# Patient Record
(demographics unavailable — no encounter records)

---

## 2024-10-25 NOTE — PHYSICAL EXAM
[2+] : 2+ [Normal Gait and Station] : normal gait and station [Normal muscle strength, symmetry and tone of facial, head and neck musculature] : normal muscle strength, symmetry and tone of facial, head and neck musculature [Normal] : no cervical lymphadenopathy [Exposed Vessel] : left anterior vessel not exposed [Increased Work of Breathing] : no increased work of breathing with use of accessory muscles and retractions [FreeTextEntry7] : soft ballotable mass in post auricular crease

## 2024-10-25 NOTE — CONSULT LETTER
[Dear  ___] : Dear  [unfilled], [FreeTextEntry2] : Dr. Brenton Alvarez [FreeTextEntry3] : Swapnil Workman MD  Pediatric Otolaryngology/ Head & Neck Surgery [facility] [address] [place] [address] Tel [phone] Fax [phone]

## 2024-10-25 NOTE — HISTORY OF PRESENT ILLNESS
[de-identified] : 10/25/24 Recently sick with URI, currently has nasal congestion and rhinorrhea Was using flonase PRN, stopped when he got sick 2 weeks ago Snoring at night, NO choking gasping or apneas PSG scheduled for 12/17/24 Eating and drinking well, no difficulty swallowing No difficulty breathing  04-22-24 21 month old male presents for follow up. Mother reports runny nose has improved. No recent ear infections. Continues to snore but no pauses in breathing, apneic episodes.  Used flonase with mild improvement  01-19-24 Darin is a 17 month old M with chronic nasal congestion A few weeks ago started with purulent drainage, improved with abx given by PMD Seen by another ENT who said give it time  +Nasal congestion No prior nasal steroid use +Snoring, daytime tiredness No prior PSG  No recent ear infections No otorrhea Bump behind L ear that concerns parents Passed [facility] Patient babbles and says 4 words, concern noted by parents No recent throat infections No bleeding or anesthesia issues

## 2024-10-25 NOTE — BIRTH HISTORY
[At ___ Weeks Gestation] : at [unfilled] weeks gestation [Normal Vaginal Route] : by normal vaginal route [None] : No maternal complications [Passed] : passed [de-identified] : ingested muconium, nicu x 1 day

## 2024-10-25 NOTE — REASON FOR VISIT
[Subsequent Evaluation] : a subsequent evaluation for [Nasal Obstruction] : nasal obstruction [Nasal Discharge] : nasal discharge [Sleep Apnea/ Snoring] : sleep apnea/ snoring [Parents] : parents

## 2024-10-25 NOTE — ASSESSMENT
[FreeTextEntry1] : 2 year male with Nasal congestion and severe adenoid hypertrophy. Discussed medical vs surgical therapy  Can trial nasal steroid when symptomatic.  Use at least for 3 weeks before stopping.   Await PSG and plan adenoids, possible T&A  soft ballotable mass in post auricular crease.  Most likely benign.  Can consider excision if goes to OR for other reasons.  ordered US.    F/u after PSG.

## 2025-01-27 NOTE — PHYSICAL EXAM
[General Appearance - Alert] : alert [General Appearance - Well-Appearing] : well appearing [General Appearance - In No Acute Distress] : in no acute distress [Appearance Of Head] : the head was normocephalic [Evidence Of Head Injury] : threre was no evidence of injury [Facies] : no facial abnormalities were observed [Sclera] : the conjunctiva were normal [Outer Ear] : the ears and nose were normal in appearance [Examination Of The Oral Cavity] : mucous membranes were moist and pink [Normal Appearance] : was normal in appearance [Neck Supple] : was supple [Respiration, Rhythm And Depth] : normal respiratory rhythm and effort [Auscultation Breath Sounds / Voice Sounds] : clear bilateral breath sounds [Heart Rate And Rhythm] : heart rate and rhythm were normal [Heart Sounds] : normal S1 and S2 [Murmurs] : no murmurs [Bowel Sounds] : normal bowel sounds [Abdomen Soft] : soft [Abdomen Tenderness] : non-tender [Abdominal Distention] : nondistended [] : no hepato-splenomegaly [FROM Extremities] : there was normal movement of all extremities [Normal Motor Tone] : the muscle tone was normal [Motor Tone] : muscle strength and tone were normal [Generalized Lymph Node Enlargement] : no lymphadenopathy [Abnormal Color] : normal color and pigmentation [Skin Lesions 1] : no skin lesions were observed [Skin Turgor Decreased] : normal skin turgor [Normal] : normal texture and mobility [Breast Appearance] : normal in appearance [Penis Abnormality] : the penis was normal [Testes Cryptorchism] : both testicles were descended

## 2025-01-27 NOTE — HISTORY OF PRESENT ILLNESS
[Preoperative Visit] : for a medical evaluation prior to surgery [Good] : Good [Fever] : no fever [Chills] : no chills [Earache] : no earache [Headache] : no headache [Sore Throat] : no sore throat [Cough] : no cough [Appetite] : no decrease in appetite [Nausea] : no nausea [Vomiting] : no vomiting [Abdominal Pain] : no abdominal pain [Diarrhea] : no diarrhea [Easy Bruising] : no easy bruising [Rash] : no rash [Dysuria] : no dysuria [Urinary Frequency] : no urinary frequency [Prior Anesthesia] : No prior anesthesia [Prev Anesthesia Reaction] : no previous anesthesia reaction [Pulmonary Disease] : no pulmonary disease [Renal Disease] : no renal disease [GI Disease] : no gastrointestinal disease [Sleep Apnea] : sleep apnea [Transfusion Reaction] : no transfusion reaction [Impaired Immunity] : no impaired immunity [Frequent use of NSAIDs] : no use of NSAIDs [Anesthesia Reaction] : no anesthesia reaction [Clotting Disorder] : no clotting disorder [Bleeding Disorder] : no bleeding disorder [Sudden Death] : no sudden death [FreeTextEntry1] : T&A [FreeTextEntry2] : 02/19/2025 [de-identified] : Dr. Workman

## 2025-04-18 NOTE — CONSULT LETTER
[Dear  ___] : Dear  [unfilled], [FreeTextEntry2] : Dr. Brenton Alvarez [FreeTextEntry3] : Swapnil Workman MD  Pediatric Otolaryngology/ Head & Neck Surgery [facility] [address] [place] [address] Tel [phone] Fax [phone]

## 2025-04-18 NOTE — HISTORY OF PRESENT ILLNESS
[de-identified] : 4/18/25 S/P adenoids and left postauricular mass excision 2/19/25 no more snoring or nasal congestion still having runny nose  not using Flonase no recent ear, throat, or lung infections  no hearing or speech concerns  10/25/24 Recently sick with URI, currently has nasal congestion and rhinorrhea Was using flonase PRN, stopped when he got sick 2 weeks ago Snoring at night, NO choking gasping or apneas PSG scheduled for 12/17/24 Eating and drinking well, no difficulty swallowing No difficulty breathing  04-22-24 21 month old male presents for follow up. Mother reports runny nose has improved. No recent ear infections. Continues to snore but no pauses in breathing, apneic episodes.  Used flonase with mild improvement  01-19-24 Darin is a 17 month old M with chronic nasal congestion A few weeks ago started with purulent drainage, improved with abx given by PMD Seen by another ENT who said give it time  +Nasal congestion No prior nasal steroid use +Snoring, daytime tiredness No prior PSG  No recent ear infections No otorrhea Bump behind L ear that concerns parents Passed [facility] Patient babbles and says 4 words, concern noted by parents No recent throat infections No bleeding or anesthesia issues

## 2025-04-18 NOTE — PHYSICAL EXAM
[2+] : 2+ [Normal Gait and Station] : normal gait and station [Normal muscle strength, symmetry and tone of facial, head and neck musculature] : normal muscle strength, symmetry and tone of facial, head and neck musculature [Normal] : no cervical lymphadenopathy [Exposed Vessel] : left anterior vessel not exposed [Wheezing] : no wheezing [Increased Work of Breathing] : no increased work of breathing with use of accessory muscles and retractions [FreeTextEntry7] : well healed incision

## 2025-04-18 NOTE — ASSESSMENT
[FreeTextEntry1] : 2 year male with Nasal congestion and severe adenoid hypertrophy.  s/p adenoidectomy.    Discussed that adenoids can grow back and that we will monitor for now.  Any signs of recurrent nasal congestion or snoring they should let us know.  Monitor tonsils for now. Discussed recurrent strep and OLIMPIA as the primary indications of tonsil removal. Discussed S/sx to monitor for and to let us know if anything changes.   S/p left dermoid cyst removal. doing great. path reviewed.   RTC PRN

## 2025-04-18 NOTE — BIRTH HISTORY
[At ___ Weeks Gestation] : at [unfilled] weeks gestation [Normal Vaginal Route] : by normal vaginal route [None] : No maternal complications [Passed] : passed [de-identified] : ingested muconium, nicu x 1 day